# Patient Record
Sex: MALE | Race: WHITE | Employment: OTHER | URBAN - METROPOLITAN AREA
[De-identification: names, ages, dates, MRNs, and addresses within clinical notes are randomized per-mention and may not be internally consistent; named-entity substitution may affect disease eponyms.]

---

## 2022-02-08 ENCOUNTER — CONSULTATION/EVALUATION (OUTPATIENT)
Dept: URBAN - METROPOLITAN AREA CLINIC 35 | Facility: CLINIC | Age: 77
End: 2022-02-08

## 2022-02-08 DIAGNOSIS — E11.9: ICD-10-CM

## 2022-02-08 DIAGNOSIS — H04.123: ICD-10-CM

## 2022-02-08 DIAGNOSIS — H25.812: ICD-10-CM

## 2022-02-08 DIAGNOSIS — H25.811: ICD-10-CM

## 2022-02-08 PROCEDURE — V2799PMN IMPRIMIS PRED-MOXI-NEPAF 5ML

## 2022-02-08 PROCEDURE — 92004 COMPRE OPH EXAM NEW PT 1/>: CPT

## 2022-02-08 PROCEDURE — 92136TC INTERFEROMETRY - TECHNICAL COMPONENT

## 2022-02-08 ASSESSMENT — VISUAL ACUITY
OS_CC: 20/50
OD_CC: 20/25-1
OD_SC: J12
OS_SC: 20/50-2
OS_SC: J10
OD_SC: 20/40-2

## 2022-02-08 ASSESSMENT — TONOMETRY
OS_IOP_MMHG: 14
OD_IOP_MMHG: 14

## 2022-03-03 ENCOUNTER — PRE-OP/H&P (OUTPATIENT)
Dept: URBAN - METROPOLITAN AREA SURGERY 14 | Facility: SURGERY | Age: 77
End: 2022-03-03

## 2022-03-03 ENCOUNTER — SURGERY/PROCEDURE (OUTPATIENT)
Dept: URBAN - METROPOLITAN AREA CLINIC 35 | Facility: CLINIC | Age: 77
End: 2022-03-03

## 2022-03-03 ENCOUNTER — POST-OP (OUTPATIENT)
Dept: URBAN - METROPOLITAN AREA CLINIC 39 | Facility: CLINIC | Age: 77
End: 2022-03-03

## 2022-03-03 DIAGNOSIS — H25.811: ICD-10-CM

## 2022-03-03 DIAGNOSIS — Z96.1: ICD-10-CM

## 2022-03-03 DIAGNOSIS — H25.812: ICD-10-CM

## 2022-03-03 PROCEDURE — 6698454 REMOVE CATARACT;INSERT LENS (SX ONLY)

## 2022-03-03 PROCEDURE — 99211T TECH SERVICE

## 2022-03-03 ASSESSMENT — VISUAL ACUITY: OS_SC: 20/50-2

## 2022-03-03 ASSESSMENT — TONOMETRY: OS_IOP_MMHG: 8

## 2022-03-30 ENCOUNTER — SURGERY/PROCEDURE (OUTPATIENT)
Dept: URBAN - METROPOLITAN AREA CLINIC 35 | Facility: CLINIC | Age: 77
End: 2022-03-30

## 2022-03-30 ENCOUNTER — POST OP/EVAL OF SECOND EYE (OUTPATIENT)
Dept: URBAN - METROPOLITAN AREA CLINIC 39 | Facility: CLINIC | Age: 77
End: 2022-03-30

## 2022-03-30 DIAGNOSIS — Z96.1: ICD-10-CM

## 2022-03-30 DIAGNOSIS — H25.811: ICD-10-CM

## 2022-03-30 PROCEDURE — 99212 OFFICE O/P EST SF 10 MIN: CPT

## 2022-03-30 PROCEDURE — 6698454 REMOVE CATARACT;INSERT LENS (SX ONLY)

## 2022-03-30 RX ORDER — PREDNISOLONE ACETATE 10 MG/ML: 1 SUSPENSION/ DROPS OPHTHALMIC

## 2022-03-30 ASSESSMENT — TONOMETRY
OS_IOP_MMHG: 9
OD_IOP_MMHG: 10

## 2022-03-30 ASSESSMENT — VISUAL ACUITY
OD_SC: 20/40-2
OD_BAT: 20/70
OS_SC: 20/60-2

## 2022-03-30 NOTE — PATIENT DISCUSSION
The types of intraocular lenses were reviewed with the patient along with a discussion of their various strengths and weaknesses.  Basic Susie Goal.